# Patient Record
Sex: MALE | Race: OTHER | Employment: UNEMPLOYED | URBAN - METROPOLITAN AREA
[De-identification: names, ages, dates, MRNs, and addresses within clinical notes are randomized per-mention and may not be internally consistent; named-entity substitution may affect disease eponyms.]

---

## 2018-02-12 ENCOUNTER — HOSPITAL ENCOUNTER (OUTPATIENT)
Age: 33
Discharge: ANOTHER HOSPITAL | End: 2018-02-12

## 2018-02-12 VITALS — HEART RATE: 54 BPM | SYSTOLIC BLOOD PRESSURE: 127 MMHG | DIASTOLIC BLOOD PRESSURE: 73 MMHG | OXYGEN SATURATION: 98 %

## 2018-02-12 DIAGNOSIS — R10.9 ABDOMINAL PAIN, ACUTE: Primary | ICD-10-CM

## 2018-02-12 PROCEDURE — 99203 OFFICE O/P NEW LOW 30 MIN: CPT

## 2018-02-12 PROCEDURE — 36415 COLL VENOUS BLD VENIPUNCTURE: CPT

## 2018-02-13 NOTE — ED PROVIDER NOTES
No chief complaint on file. HPI:     Ari Godoy 34year old male who was found in the lobby on the ground complaining of abdominal pain.   Patient reports he has a history of pancreatitis and was to follow-up with his primary care provider and never d

## 2018-02-13 NOTE — ED INITIAL ASSESSMENT (HPI)
Pt here with severe abd pain, distended abd states he has a history of pancreatitis but has not followed up with anyone. 911 called for patient.